# Patient Record
Sex: FEMALE | Race: ASIAN | NOT HISPANIC OR LATINO | URBAN - METROPOLITAN AREA
[De-identification: names, ages, dates, MRNs, and addresses within clinical notes are randomized per-mention and may not be internally consistent; named-entity substitution may affect disease eponyms.]

---

## 2024-02-06 ENCOUNTER — OV NP (OUTPATIENT)
Dept: URBAN - METROPOLITAN AREA CLINIC 78 | Facility: CLINIC | Age: 29
End: 2024-02-06

## 2024-02-19 ENCOUNTER — OV NP (OUTPATIENT)
Dept: URBAN - METROPOLITAN AREA CLINIC 78 | Facility: CLINIC | Age: 29
End: 2024-02-19
Payer: COMMERCIAL

## 2024-02-19 VITALS
HEIGHT: 65 IN | DIASTOLIC BLOOD PRESSURE: 78 MMHG | TEMPERATURE: 97.6 F | BODY MASS INDEX: 24.29 KG/M2 | SYSTOLIC BLOOD PRESSURE: 124 MMHG | HEART RATE: 59 BPM | WEIGHT: 145.8 LBS

## 2024-02-19 DIAGNOSIS — R12 HEARTBURN: ICD-10-CM

## 2024-02-19 DIAGNOSIS — R11.0 NAUSEA: ICD-10-CM

## 2024-02-19 DIAGNOSIS — R10.13 EPIGASTRIC PAIN: ICD-10-CM

## 2024-02-19 DIAGNOSIS — R14.0 BLOATING: ICD-10-CM

## 2024-02-19 PROCEDURE — 99204 OFFICE O/P NEW MOD 45 MIN: CPT | Performed by: INTERNAL MEDICINE

## 2024-02-19 RX ORDER — OMEPRAZOLE 20 MG/1
1 CAPSULE CAPSULE, DELAYED RELEASE ORAL
Qty: 30 | Refills: 3 | OUTPATIENT
Start: 2024-02-19

## 2024-02-19 NOTE — PHYSICAL EXAM NECK/THYROID:
Sister ok with taking pt [facility] and will be going today normal appearance , without tenderness upon palpation , no deformities , trachea midline , Thyroid normal size , no masses , thyroid nontender

## 2024-02-19 NOTE — HPI-TODAY'S VISIT:
The patient was self-referred to us for epigastric pain and heartburn. A copy of this note will be sent to the referring physician.   The patient reports chronic epigastric pain, mainly triggered by spicy foods. The pain has ranged between 5-8/10 in intensity.  She has had occasional associated nausea but no vomtiing.  No dysphagia.  She has also noted occasional bloating.   The patient has never had either EGD or colonoscopy previously. There is no FH of esoph/gastric/colon cancer or colon polyps.   She has rarely noted BRB in the TP. The patient has no pertinent additional complaints of abdominal pain in the lower abdomen, constipation, diarrhea, anorexia or unintentional weight loss.   The patient does not take blood thinners.  They deny any CP or CARSON.  i was born in [facility] and moved to the [place] when she was 21.

## 2024-02-28 ENCOUNTER — EGD (OUTPATIENT)
Dept: URBAN - METROPOLITAN AREA SURGERY CENTER 15 | Facility: SURGERY CENTER | Age: 29
End: 2024-02-28